# Patient Record
Sex: MALE | URBAN - METROPOLITAN AREA
[De-identification: names, ages, dates, MRNs, and addresses within clinical notes are randomized per-mention and may not be internally consistent; named-entity substitution may affect disease eponyms.]

---

## 2017-06-12 ENCOUNTER — OFFICE VISIT (OUTPATIENT)
Dept: FAMILY MEDICINE | Facility: CLINIC | Age: 22
End: 2017-06-12
Payer: COMMERCIAL

## 2017-06-12 VITALS
HEART RATE: 60 BPM | HEIGHT: 73 IN | TEMPERATURE: 98 F | BODY MASS INDEX: 25.31 KG/M2 | OXYGEN SATURATION: 97 % | SYSTOLIC BLOOD PRESSURE: 120 MMHG | WEIGHT: 191 LBS | DIASTOLIC BLOOD PRESSURE: 76 MMHG

## 2017-06-12 DIAGNOSIS — Z23 NEED FOR TDAP VACCINATION: ICD-10-CM

## 2017-06-12 DIAGNOSIS — Z00.00 ROUTINE GENERAL MEDICAL EXAMINATION AT A HEALTH CARE FACILITY: Primary | ICD-10-CM

## 2017-06-12 PROCEDURE — 99395 PREV VISIT EST AGE 18-39: CPT | Mod: 25 | Performed by: FAMILY MEDICINE

## 2017-06-12 PROCEDURE — 90715 TDAP VACCINE 7 YRS/> IM: CPT | Performed by: FAMILY MEDICINE

## 2017-06-12 PROCEDURE — 90471 IMMUNIZATION ADMIN: CPT | Performed by: FAMILY MEDICINE

## 2017-06-12 ASSESSMENT — PAIN SCALES - GENERAL: PAINLEVEL: NO PAIN (0)

## 2017-06-12 NOTE — PROGRESS NOTES
SUBJECTIVE:     CC: Harman Giles is an 21 year old male who presents for preventative health visit.     Healthy Habits:    Do you get at least three servings of calcium containing foods daily (dairy, green leafy vegetables, etc.)? yes    Amount of exercise or daily activities, outside of work: 5-6 day(s) per week    Problems taking medications regularly not applicable    Medication side effects: No    Have you had an eye exam in the past two years? yes    Do you see a dentist twice per year? 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    Last PSA: No results found for: PSA    No results for input(s): CHOL, HDL, LDL, TRIG, CHOLHDLRATIO, NHDL in the last 84741 hours.    Reviewed orders with patient. Reviewed health maintenance and updated orders accordingly - Yes    Reviewed and updated as needed this visit by clinical staff         Reviewed and updated as needed this visit by Provider            ROS:  C: NEGATIVE for fever, chills, change in weight  I: NEGATIVE for worrisome rashes, moles or lesions  E: NEGATIVE for vision changes or irritation  ENT: NEGATIVE for ear, mouth and throat problems  R: NEGATIVE for significant cough or SOB  CV: NEGATIVE for chest pain, palpitations or peripheral edema  GI:  NEGATIVE for nausea, abdominal pain, heartburn, or change in bowel habits   male: negative for dysuria, hematuria, decreased urinary stream, erectile dysfunction, urethral discharge  M: NEGATIVE for significant arthralgias or myalgia  N: NEGATIVE for weakness, dizziness or paresthesias  P: NEGATIVE for changes in mood or affect  Sees eye doctor annually   No std concerns  No need for birth control      OBJECTIVE:     There were no vitals taken for this visit.  EXAM:  GENERAL: healthy, alert and no distress  EYES: Eyes grossly normal to inspection, PERRL and conjunctivae and sclerae normal  HENT: ear canals and TM's normal, nose and mouth without ulcers or lesions  NECK: no adenopathy, no asymmetry, masses, or scars and thyroid normal to palpation  RESP: lungs clear to auscultation - no rales, rhonchi or wheezes  CV: regular rate and rhythm, normal S1 S2, no S3 or S4, no murmur, click or rub, no peripheral edema and peripheral pulses strong  ABDOMEN: soft, nontender, no hepatosplenomegaly, no masses and bowel sounds normal   (male): normal male genitalia without lesions or urethral discharge, no hernia  MS: no gross musculoskeletal defects noted, no edema  SKIN: no suspicious lesions or rashes  NEURO: Normal strength and tone, mentation intact and speech normal  PSYCH: mentation appears normal, affect normal/bright    ASSESSMENT/PLAN:     Harman was seen today for physical.    Diagnoses and all orders for this visit:    Routine general medical examination at a health care facility    Need for Tdap vaccination  -     TDAP VACCINE (ADACEL)    overall patient in very good health  Good energy level  tdap given   No std concern  Moving to [place]         COUNSELING:  Reviewed preventive health counseling, as reflected in patient instructions       Regular exercise       Healthy diet/nutrition       Vision screening       Family planning       Safe sex practices/STD prevention         reports that he has never smoked.  "He has never used smokeless tobacco.    Estimated body mass index is 25.45 kg/(m^2) as calculated from the following:    Height as of 12/23/13: 6' 0.83\" (1.85 m).    Weight as of 12/23/13: 192 lb (87.1 kg).       Counseling Resources:  ATP IV Guidelines  Pooled Cohorts Equation Calculator  FRAX Risk Assessment  ICSI Preventive Guidelines  Dietary Guidelines for Americans, 2010  USDA's MyPlate  ASA Prophylaxis  Lung CA Screening    Gab Smith MD  [facility]  "

## 2017-06-12 NOTE — PATIENT INSTRUCTIONS
Preventive Health Recommendations  Male Ages 18 - 25     Yearly exam:             See your health care provider every year in order to  o   Review health changes.   o   Discuss preventive care.    o   Review your medicines if your doctor has prescribed any.    You should be tested each year for STDs (sexually transmitted diseases).     Talk to your provider about cholesterol testing.      If you are at risk for diabetes, you should have a diabetes test (fasting glucose).    Shots: Get a flu shot each year. Get a tetanus shot every 10 years.     Nutrition:    Eat at least 5 servings of fruits and vegetables daily.     Eat whole-grain bread, whole-wheat pasta and brown rice instead of white grains and rice.     Talk to your provider about calcium and Vitamin D.     Lifestyle    Exercise for at least 150 minutes a week (30 minutes a day, 5 days a week). This will help you control your weight and prevent disease.     Limit alcohol to one drink per day.     No smoking.     Wear sunscreen to prevent skin cancer.     See your dentist every six months for an exam and cleaning.       Keep working on healthy diet/exercise     Call with problems/ questions

## 2017-06-12 NOTE — MR AVS SNAPSHOT
After Visit Summary   6/12/2017    Harman Giles    MRN: [id number]           Patient Information     Date Of Birth          1995        Visit Information        Provider Department      6/12/2017 7:20 AM Gab Smith MD [facility]        Today's Diagnoses     Need for Tdap vaccination    -  1      Care Instructions      Preventive Health Recommendations  Male Ages 18 - 25     Yearly exam:             See your health care provider every year in order to  o   Review health changes.   o   Discuss preventive care.    o   Review your medicines if your doctor has prescribed any.    You should be tested each year for STDs (sexually transmitted diseases).     Talk to your provider about cholesterol testing.      If you are at risk for diabetes, you should have a diabetes test (fasting glucose).    Shots: Get a flu shot each year. Get a tetanus shot every 10 years.     Nutrition:    Eat at least 5 servings of fruits and vegetables daily.     Eat whole-grain bread, whole-wheat pasta and brown rice instead of white grains and rice.     Talk to your provider about calcium and Vitamin D.     Lifestyle    Exercise for at least 150 minutes a week (30 minutes a day, 5 days a week). This will help you control your weight and prevent disease.     Limit alcohol to one drink per day.     No smoking.     Wear sunscreen to prevent skin cancer.     See your dentist every six months for an exam and cleaning.       Keep working on healthy diet/exercise     Call with problems/ questions              Follow-ups after your visit        Who to contact     If you have questions or need follow up information about today's clinic visit or your schedule please contact [facility] directly at [phone].  Normal or non-critical lab and imaging results will be communicated to you by MyChart, letter or phone within 4 business days after the clinic has received the  "results. If you do not hear from us within 7 days, please contact the clinic through [employer] or phone. If you have a critical or abnormal lab result, we will notify you by phone as soon as possible.  Submit refill requests through [employer] or call your pharmacy and they will forward the refill request to us. Please allow 3 business days for your refill to be completed.          Additional Information About Your Visit        Cell>[employer] Information     [employer] lets you send messages to your doctor, view your test results, renew your prescriptions, schedule appointments and more. To sign up, go to [email or web address] . Click on \"Log in\" on the left side of the screen, which will take you to the Welcome page. Then click on \"Sign up Now\" on the right side of the page.     You will be asked to enter the access code listed below, as well as some personal information. Please follow the directions to create your username and password.     Your access code is: [email or web address]  Expires: 9/10/2017  7:39 AM     Your access code will  in 90 days. If you need help or a new code, please call your [place] clinic or [phone].        Care EveryWhere ID     This is your Care EveryWhere ID. This could be used by other organizations to access your [place] medical records  [id number]        Your Vitals Were     Pulse Temperature Height Pulse Oximetry BMI (Body Mass Index)       60 98  F (36.7  C) (Oral) 6' 0.75\" (1.848 m) 97% 25.37 kg/m2        Blood Pressure from Last 3 Encounters:   17 120/76   14 144/69   14 123/71    Weight from Last 3 Encounters:   17 191 lb (86.6 kg)   13 192 lb (87.1 kg)   11 167 lb (75.8 kg) (75 %)*     * Growth percentiles are based on CDC 2-20 Years data.              We Performed the Following     TDAP VACCINE (ADACEL)        Primary Care Provider Office Phone # Fax #    Gab Smith [phone]       [address]  [facility] [address]        Thank you!     Thank you for choosing [facility]  for your care. Our goal is always to provide you with excellent care. Hearing back from our patients is one way we can continue to improve our services. Please take a few minutes to complete the written survey that you may receive in the mail after your visit with us. Thank you!             Your Updated Medication List - Protect others around you: Learn how to safely use, store and throw away your medicines at www.disposemymeds.org.          This list is accurate as of: 6/12/17  7:39 AM.  Always use your most recent med list.                   Brand Name Dispense Instructions for use    ibuprofen 200 MG tablet   Generic drug:  ibuprofen      1 TABLET EVERY 4 TO 6 HOURS AS NEEDED       MULTIVITAMIN TABS   OR      1 TABLET DAILY

## 2018-05-16 DIAGNOSIS — Z52.001 DONOR OF STEM CELL: Primary | ICD-10-CM

## 2018-05-31 ENCOUNTER — APPOINTMENT (OUTPATIENT)
Dept: LAB | Facility: CLINIC | Age: 23
End: 2018-05-31
Attending: INTERNAL MEDICINE
Payer: COMMERCIAL

## 2018-05-31 ENCOUNTER — OFFICE VISIT (OUTPATIENT)
Dept: TRANSPLANT | Facility: CLINIC | Age: 23
End: 2018-05-31
Attending: INTERNAL MEDICINE
Payer: COMMERCIAL

## 2018-05-31 ENCOUNTER — RADIANT APPOINTMENT (OUTPATIENT)
Dept: GENERAL RADIOLOGY | Facility: CLINIC | Age: 23
End: 2018-05-31
Attending: INTERNAL MEDICINE
Payer: COMMERCIAL

## 2018-05-31 VITALS
RESPIRATION RATE: 16 BRPM | SYSTOLIC BLOOD PRESSURE: 151 MMHG | TEMPERATURE: 98 F | WEIGHT: 190.3 LBS | OXYGEN SATURATION: 100 % | HEIGHT: 73 IN | HEART RATE: 59 BPM | DIASTOLIC BLOOD PRESSURE: 86 MMHG | BODY MASS INDEX: 25.22 KG/M2

## 2018-05-31 DIAGNOSIS — Z52.001 DONOR OF STEM CELL: ICD-10-CM

## 2018-05-31 DIAGNOSIS — Z52.001 DONOR OF STEM CELL: Primary | ICD-10-CM

## 2018-05-31 LAB
ABO + RH BLD: NORMAL
ABO + RH BLD: NORMAL
ALBUMIN SERPL-MCNC: 3.9 G/DL (ref 3.4–5)
ALBUMIN UR-MCNC: NEGATIVE MG/DL
ALP SERPL-CCNC: 79 U/L (ref 40–150)
ALT SERPL W P-5'-P-CCNC: 33 U/L (ref 0–70)
ANION GAP SERPL CALCULATED.3IONS-SCNC: 7 MMOL/L (ref 3–14)
APPEARANCE UR: CLEAR
APTT PPP: 26 SEC (ref 22–37)
AST SERPL W P-5'-P-CCNC: 22 U/L (ref 0–45)
BASOPHILS # BLD AUTO: 0 10E9/L (ref 0–0.2)
BASOPHILS NFR BLD AUTO: 0.2 %
BILIRUB SERPL-MCNC: 0.5 MG/DL (ref 0.2–1.3)
BILIRUB UR QL STRIP: NEGATIVE
BLD GP AB SCN SERPL QL: NORMAL
BLOOD BANK CMNT PATIENT-IMP: NORMAL
BUN SERPL-MCNC: 16 MG/DL (ref 7–30)
CALCIUM SERPL-MCNC: 9 MG/DL (ref 8.5–10.1)
CHLORIDE SERPL-SCNC: 104 MMOL/L (ref 94–109)
CO2 SERPL-SCNC: 27 MMOL/L (ref 20–32)
COLOR UR AUTO: YELLOW
CREAT SERPL-MCNC: 1.09 MG/DL (ref 0.66–1.25)
DIFFERENTIAL METHOD BLD: NORMAL
EOSINOPHIL # BLD AUTO: 0.3 10E9/L (ref 0–0.7)
EOSINOPHIL NFR BLD AUTO: 4 %
ERYTHROCYTE [DISTWIDTH] IN BLOOD BY AUTOMATED COUNT: 11.9 % (ref 10–15)
GFR SERPL CREATININE-BSD FRML MDRD: 83 ML/MIN/1.7M2
GLUCOSE SERPL-MCNC: 67 MG/DL (ref 70–99)
GLUCOSE UR STRIP-MCNC: NEGATIVE MG/DL
HCT VFR BLD AUTO: 45.1 % (ref 40–53)
HGB BLD-MCNC: 15.9 G/DL (ref 13.3–17.7)
HGB UR QL STRIP: NEGATIVE
IMM GRANULOCYTES # BLD: 0 10E9/L (ref 0–0.4)
IMM GRANULOCYTES NFR BLD: 0.6 %
INR PPP: 1.02 (ref 0.86–1.14)
KETONES UR STRIP-MCNC: NEGATIVE MG/DL
LDH SERPL L TO P-CCNC: 158 U/L (ref 85–227)
LEUKOCYTE ESTERASE UR QL STRIP: ABNORMAL
LYMPHOCYTES # BLD AUTO: 1.8 10E9/L (ref 0.8–5.3)
LYMPHOCYTES NFR BLD AUTO: 27.9 %
MCH RBC QN AUTO: 31.9 PG (ref 26.5–33)
MCHC RBC AUTO-ENTMCNC: 35.3 G/DL (ref 31.5–36.5)
MCV RBC AUTO: 91 FL (ref 78–100)
MONOCYTES # BLD AUTO: 0.5 10E9/L (ref 0–1.3)
MONOCYTES NFR BLD AUTO: 7.4 %
NEUTROPHILS # BLD AUTO: 3.9 10E9/L (ref 1.6–8.3)
NEUTROPHILS NFR BLD AUTO: 59.9 %
NITRATE UR QL: NEGATIVE
NRBC # BLD AUTO: 0 10*3/UL
NRBC BLD AUTO-RTO: 0 /100
PH UR STRIP: 6 PH (ref 5–7)
PLATELET # BLD AUTO: 164 10E9/L (ref 150–450)
POTASSIUM SERPL-SCNC: 3.9 MMOL/L (ref 3.4–5.3)
PROT SERPL-MCNC: 6.8 G/DL (ref 6.8–8.8)
RBC # BLD AUTO: 4.98 10E12/L (ref 4.4–5.9)
RBC #/AREA URNS AUTO: 1 /HPF (ref 0–2)
SODIUM SERPL-SCNC: 139 MMOL/L (ref 133–144)
SOURCE: ABNORMAL
SP GR UR STRIP: 1.01 (ref 1–1.03)
SPECIMEN EXP DATE BLD: NORMAL
UROBILINOGEN UR STRIP-MCNC: 0 MG/DL (ref 0–2)
WBC # BLD AUTO: 6.5 10E9/L (ref 4–11)
WBC #/AREA URNS AUTO: 2 /HPF (ref 0–5)

## 2018-05-31 PROCEDURE — 80053 COMPREHEN METABOLIC PANEL: CPT | Performed by: INTERNAL MEDICINE

## 2018-05-31 PROCEDURE — 93005 ELECTROCARDIOGRAM TRACING: CPT

## 2018-05-31 PROCEDURE — 86901 BLOOD TYPING SEROLOGIC RH(D): CPT | Performed by: INTERNAL MEDICINE

## 2018-05-31 PROCEDURE — 93010 ELECTROCARDIOGRAM REPORT: CPT | Mod: ZP | Performed by: INTERNAL MEDICINE

## 2018-05-31 PROCEDURE — 83615 LACTATE (LD) (LDH) ENZYME: CPT | Performed by: INTERNAL MEDICINE

## 2018-05-31 PROCEDURE — 85610 PROTHROMBIN TIME: CPT | Performed by: INTERNAL MEDICINE

## 2018-05-31 PROCEDURE — 81001 URINALYSIS AUTO W/SCOPE: CPT | Performed by: INTERNAL MEDICINE

## 2018-05-31 PROCEDURE — 86900 BLOOD TYPING SEROLOGIC ABO: CPT | Performed by: INTERNAL MEDICINE

## 2018-05-31 PROCEDURE — 85730 THROMBOPLASTIN TIME PARTIAL: CPT | Performed by: INTERNAL MEDICINE

## 2018-05-31 PROCEDURE — 86850 RBC ANTIBODY SCREEN: CPT | Performed by: INTERNAL MEDICINE

## 2018-05-31 PROCEDURE — 83021 HEMOGLOBIN CHROMOTOGRAPHY: CPT | Performed by: INTERNAL MEDICINE

## 2018-05-31 PROCEDURE — 85025 COMPLETE CBC W/AUTO DIFF WBC: CPT | Performed by: INTERNAL MEDICINE

## 2018-05-31 PROCEDURE — 36415 COLL VENOUS BLD VENIPUNCTURE: CPT

## 2018-05-31 ASSESSMENT — PAIN SCALES - GENERAL: PAINLEVEL: NO PAIN (0)

## 2018-05-31 NOTE — MR AVS SNAPSHOT
After Visit Summary   5/31/2018    Harman Giles    MRN: [id number]           Patient Information     Date Of Birth          1995        Visit Information        Provider Department      5/31/2018 9:00 AM Coordinator, Emmett Bmt Nurse; EMMETT 2 116 CONSULT [facility] Blood and Marrow Transplant        Today's Diagnoses     Donor of stem cell    -  1          [facility]  [address] [place] [address]  Phone: [phone]  Clinic Hours:   Monday-Thursday:7am to 7pm   Friday: 7am to 5pm   Weekends and holidays:    8am to noon (in general)  If your fever is 100.5  or greater,   call the clinic.  After hours call the   hospital at [phone] or   [phone]. Ask for the BMT   fellow on-call            Follow-ups after your visit        Your next 10 appointments already scheduled     May 31, 2018  9:00 AM CDT   BMT NURSE COORD WITH ROOM with  Bmt Nurse Coordinator, EMMETT 2 116 CONSULT [facility] Blood and Marrow Transplant ([facility])    [address] [place] [address]  [address]  [facility] [address] [phone]            May 31, 2018  9:30 AM CDT   XR CHEST 2 VIEWS with UCXR1   [facility] [place] [facility] ([facility])    [address] [place] [address]  1st Floor  [facility] [address] [phone]           Please bring a list of your current medicines to your exam. (Include vitamins, minerals and over-thecounter medicines.) Leave your valuables at home.  Tell your doctor if there is a chance you may be pregnant.  You do not need to do anything special for this exam.            May 31, 2018 10:00 AM CDT   Unrelated Donor Visit with  BMT KORI #1   [facility] Blood and Marrow Transplant ([facility])    [address] [place] [address]  [address]  [facility] [address] [phone]              Who to contact     If you have questions or need follow up information about today's clinic visit or  "your schedule please contact [facility] BLOOD AND MARROW TRANSPLANT directly at [phone].  Normal or non-critical lab and imaging results will be communicated to you by MyChart, letter or phone within 4 business days after the clinic has received the results. If you do not hear from us within 7 days, please contact the clinic through [employer] or phone. If you have a critical or abnormal lab result, we will notify you by phone as soon as possible.  Submit refill requests through [employer] or call your pharmacy and they will forward the refill request to us. Please allow 3 business days for your refill to be completed.          Additional Information About Your Visit        [employer] Information     [employer] lets you send messages to your doctor, view your test results, renew your prescriptions, schedule appointments and more. To sign up, go to [email or web address] . Click on \"Log in\" on the left side of the screen, which will take you to the Welcome page. Then click on \"Sign up Now\" on the right side of the page.     You will be asked to enter the access code listed below, as well as some personal information. Please follow the directions to create your username and password.     Your access code is: [email or web address]  Expires: 8/15/2018  6:30 AM     Your access code will  in 90 days. If you need help or a new code, please call your [place] clinic or [phone].        Care EveryWhere ID     This is your Care EveryWhere ID. This could be used by other organizations to access your [place] medical records  [id number]         Blood Pressure from Last 3 Encounters:   17 120/76   14 144/69   14 123/71    Weight from Last 3 Encounters:   17 86.6 kg (191 lb)   13 87.1 kg (192 lb)   11 75.8 kg (167 lb) (75 %)*     * Growth percentiles are based on CDC 2-20 Years data.              Today, you had the following     No orders found for display       Recent Review Flowsheet Data     BMT " Recent Results Latest Ref Rng & Units 12/23/2013 5/31/2018    WBC 4.0 - 11.0 10e9/L - 6.5    Hemoglobin 13.3 - 17.7 g/dL 15.4 15.9    Platelet Count 150 - 450 10e9/L - 164    Neutrophils (Absolute) 1.6 - 8.3 10e9/L - 3.9    INR 0.86 - 1.14 - 1.02    Sodium 133 - 144 mmol/L - 139    Potassium 3.4 - 5.3 mmol/L - 3.9    Chloride 94 - 109 mmol/L - 104    Glucose 70 - 99 mg/dL - 67(L)    Urea Nitrogen 7 - 30 mg/dL - 16    Creatinine 0.66 - 1.25 mg/dL - 1.09    Calcium (Total) 8.5 - 10.1 mg/dL - 9.0    Protein (Total) 6.8 - 8.8 g/dL - 6.8    Albumin 3.4 - 5.0 g/dL - 3.9    Alkaline Phosphatase 40 - 150 U/L - 79    AST 0 - 45 U/L - 22    ALT 0 - 70 U/L - 33    MCV 78 - 100 fl - 91               Primary Care Provider Office Phone # Fax #    Gab Smith [phone]       [address] [address]        Equal Access to Services     JOSE GUADALUPE CHANG : Hadii aad ku hadasho Soomaali, waaxda luqadaha, qaybta kaalmada adeegyada, waxay idiin jenny bruce. So [facility] [phone].    ATENCIÓN: Si habla español, tiene a oneil disposición servicios gratuitos de asistencia lingüística. ame al [phone].    We comply with applicable federal civil rights laws and [place] laws. We do not discriminate on the basis of race, color, national origin, age, disability, sex, sexual orientation, or gender identity.            Thank you!     Thank you for choosing [facility] BLOOD AND MARROW TRANSPLANT  for your care. Our goal is always to provide you with excellent care. Hearing back from our patients is one way we can continue to improve our services. Please take a few minutes to complete the written survey that you may receive in the mail after your visit with us. Thank you!             Your Updated Medication List - Protect others around you: Learn how to safely use, store and throw away your medicines at www.disposemymeds.org.          This list is accurate as of 5/31/18  8:54 AM.  Always  use your most recent med list.                   Brand Name Dispense Instructions for use Diagnosis    ibuprofen 200 MG tablet   Generic drug:  ibuprofen      1 TABLET EVERY 4 TO 6 HOURS AS NEEDED        MULTIVITAMIN TABS   OR      1 TABLET DAILY

## 2018-05-31 NOTE — MR AVS SNAPSHOT
After Visit Summary   5/31/2018    Harman Giles    MRN: [id number]           Patient Information     Date Of Birth          1995        Visit Information        Provider Department      5/31/2018 8:30 AM 1, Emmett Bmt Nurse [facility] Blood and Marrow Transplant        Today's Diagnoses     Donor of stem cell              [facility]  [address] [place] [address]  Phone: [phone]  Clinic Hours:   Monday-Thursday:7am to 7pm   Friday: 7am to 5pm   Weekends and holidays:    8am to noon (in general)  If your fever is 100.5  or greater,   call the clinic.  After hours call the   hospital at [phone] or   [phone]. Ask for the BMT   fellow on-call            Follow-ups after your visit        Your next 10 appointments already scheduled     May 31, 2018  8:30 AM CDT   BMT Nurse Visit with  Bmt Nurse 1   [facility] Blood and Marrow Transplant ([facility])    [address] [place] [address]  [address] [place] [address] [phone]            May 31, 2018  9:00 AM CDT   BMT NURSE COORD WITH ROOM with  Bmt Nurse Coordinator,  2 116 CONSULT RM   [facility] Blood and Marrow Transplant ([facility])    [address] [place] [address]  [address] [place] [address] [phone]            May 31, 2018  9:30 AM CDT   XR CHEST 2 VIEWS with UCXR1   [facility] ([facility])    [address] [place] [address]  1st Floor  [facility] [id number] [phone]           Please bring a list of your current medicines to your exam. (Include vitamins, minerals and over-thecounter medicines.) Leave your valuables at home.  Tell your doctor if there is a chance you may be pregnant.  You do not need to do anything special for this exam.            May 31, 2018 10:00 AM CDT   Unrelated Donor Visit with  BMT KORI #1   [facility] Blood and Marrow Transplant ([facility])  "   [facility]  [address] [place] [address] [phone]              Who to contact     If you have questions or need follow up information about today's clinic visit or your schedule please contact [facility] BLOOD AND MARROW TRANSPLANT directly at [phone].  Normal or non-critical lab and imaging results will be communicated to you by MyChart, letter or phone within 4 business days after the clinic has received the results. If you do not hear from us within 7 days, please contact the clinic through MyChart or phone. If you have a critical or abnormal lab result, we will notify you by phone as soon as possible.  Submit refill requests through [employer] or call your pharmacy and they will forward the refill request to us. Please allow 3 business days for your refill to be completed.          Additional Information About Your Visit        MyChart Information     [employer] lets you send messages to your doctor, view your test results, renew your prescriptions, schedule appointments and more. To sign up, go to [email or web address] . Click on \"Log in\" on the left side of the screen, which will take you to the Welcome page. Then click on \"Sign up Now\" on the right side of the page.     You will be asked to enter the access code listed below, as well as some personal information. Please follow the directions to create your username and password.     Your access code is: [email or web address]  Expires: 8/15/2018  6:30 AM     Your access code will  in 90 days. If you need help or a new code, please call your [place] clinic or [phone].        Care EveryWhere ID     This is your Care EveryWhere ID. This could be used by other organizations to access your [place] medical records  [id number]         Blood Pressure from Last 3 Encounters:   17 120/76   14 144/69   14 123/71    Weight from Last 3 Encounters:   17 86.6 kg (191 lb)   13 87.1 kg (192 lb)   11 75.8 kg " (167 lb) (75 %)*     * Growth percentiles are based on [facility] 2-20 Years data.              We Performed the Following     ABO/Rh type and screen     CBC with platelets differential     Comprehensive metabolic panel     EKG 12-lead complete w/read - Clinics     Hemoglobin S     INR     Lactate Dehydrogenase     Partial thromboplastin time        Recent Review Flowsheet Data     BMT Recent Results Latest Ref Rng & Units 12/23/2013    Hemoglobin 13.3 - 17.7 g/dL 15.4               Primary Care Provider Office Phone # Fax #    Gab Smith [phone]       [address] [facility] [place] [address]        Equal Access to Services     [facility]: Hadii aad ku hadasho Soomaali, waaxda luqadaha, qaybta kaalmada adepremayapatricio, nicola alvarado adeprema jaimes . So [facility] [phone].    ATENCIÓN: Si habla español, tiene a oneil disposición servicios gratuitos de asistencia lingüística. [facility] [phone].    We comply with applicable federal civil rights laws and [place] laws. We do not discriminate on the basis of race, color, national origin, age, disability, sex, sexual orientation, or gender identity.            Thank you!     Thank you for choosing [facility] BLOOD AND MARROW TRANSPLANT  for your care. Our goal is always to provide you with excellent care. Hearing back from our patients is one way we can continue to improve our services. Please take a few minutes to complete the written survey that you may receive in the mail after your visit with us. Thank you!             Your Updated Medication List - Protect others around you: Learn how to safely use, store and throw away your medicines at www.disposemymeds.org.          This list is accurate as of 5/31/18  8:06 AM.  Always use your most recent med list.                   Brand Name Dispense Instructions for use Diagnosis    ibuprofen 200 MG tablet   Generic drug:  ibuprofen      1 TABLET EVERY 4 TO 6 HOURS AS NEEDED         MULTIVITAMIN TABS   OR      1 TABLET DAILY

## 2018-05-31 NOTE — PROGRESS NOTES
BMT Donor History and Physical    Harman Giles MRN# [id number]   Age: 22 year old YOB: 1995            Assessment and Plan   Harman Giles is a 22 year old man who is a potential unrelated donor of bone marrow for the [facility]. He will be cleared for donation by Dr. Bledsoe who will review his infectious disease testing.  Clearance for donation is dependent on negative infectious disease testing. He has no known risk factors.     HPI: Harman is currently in good health.  No recent fevers, chills or weight loss.  He is not currently under the care of a physician for any conditions. His parents live here in the [facility] and his next door neighbor used to work at the [facility].  EKG and CXR are within normal limits as is CBC and chemistries. Has isolated +LE on UA without any symptoms. Addendum: no history of hypertension.  Will repeat BP at next visit.  Transfusions: No  Hepatitis: No  Currently pregnant or any chance may be pregnant: Not applicable  Currently breast feeding: No  Currently using a method of birth control: No Not applicable: Patient is male  Number of previous pregnancies: 0, not applicable  Alcohol use: Yes, amount per week: Drinks on the weekends with his friends  Tobacco use: No  Recreational drugs: No  Nonmedical percutaneous drug use: No  Recent immunizations or planning on getting any immunizations: No  Ear or body piercing in the last 12 months: No  New tattoo in recent 12 months:No  History of cancer or blood disease: No  Known risk factors: No travel out of the [place].  No risky sexual behavior.         Past Medical History:   Not under the care of a physician. No current or past medical conditions.         Past Surgical History:    Deviated septum surgery about 5 years ago with repair  Right arm surgery for fracture at 3 years old  [place] teeth removal         Social History:     Lives in [place] but is from [place] where his parents currently lives.  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  Works out, plays basketball       Family History:   Grandpa x2 had prostate cancer in their 80's.  No history of heart disease or stroke  Grandma had late onset diabetes  Parents are healthy  Brother is healthy         Immunizations:   Immunizations are up to date         Allergies:   Keflex- got a stomach from it         Medications:   Not currently taking medication including over the counter.         Review of Systems:   Constitutional: Negative, no fevers, chills or sweats, no recent weight loss   - Skin: No rash   - Musculoskeletal: no issues  - Eyes: No history of glaucoma, no double vision or floaters   - ENT: Negative, no URI symptoms, teeth in good repair   - Endocrine:no diabetes or thyroid disease  - Respiratory: Negative, no cough or sob at rest or with exertion   - Cardiovascular: Negative, no chest pain or palpitations. No history of hypertension   - Heme: Negative, no bleeding   - Lymph: Negative   - GI: No diarrhea or constipation. No blood in stool or black or tarry stools.  - : Negative, no dysuria or frequent urination, no hematuria   - Neuro: Negative  -MSK: No current issues      Physical EXAM:  Blood pressure 151/86, pulse 59, temperature 98  F (36.7  C), resp. rate 16, height 1.848 m (6' 0.76\"), weight 86.3 kg (190 lb 4.8 oz), SpO2 100 %.          General: A&O x3, appropriate, NAD, physically fit young man   - Head: NC/AT   - Eyes: PERRL. Sclera anicteric.   - Nose/Mouth/Throat: No OP erythema, buccal mucosa moist, no ulcerations.   - Neck: Supple. No LAD appreciated.   - Lungs: CTA b/l. No crackles.   - Cardiovascular: RRR, No M/R/G.   - Abdominal/Rectal: Soft, NT,ND, +BS x4   - Lymphatics: No edema.   - Musculoskeletal: Full strength.   - Skin: No rashes or petechaie.   - Neuro: A&O x 3; Non-focal, CN II-XII grossly intact                   Data:   All laboratory data reviewed  All cardiac studies reviewed by me.  All imaging studies reviewed by " me.       Namrata Ortiz PA-C

## 2018-05-31 NOTE — PROGRESS NOTES
Met with donor and discussed plan for visit.Donor will undergo a bone marrow harvest.  Discussed process for clearing the donor, the preop visit, the harvest and basic information about the recovery. Contact information provided. Questions answered.

## 2018-05-31 NOTE — MR AVS SNAPSHOT
"              After Visit Summary   5/31/2018    Harman Giles    MRN: [id number]           Patient Information     Date Of Birth          1995        Visit Information        Provider Department      5/31/2018 10:00 AM  BMT KORI #1 [facility] Blood and Marrow Transplant        Today's Diagnoses     Donor of stem cell              [facility] ([facility]  [address] [place] [address]  Phone: [phone]  Clinic Hours:   Monday-Thursday:7am to 7pm   Friday: 7am to 5pm   Weekends and holidays:    8am to noon (in general)  If your fever is 100.5  or greater,   call the clinic.  After hours call the   hospital at [phone] or   [phone]. Ask for the BMT   fellow on-call           Care Instructions    Return per nurse coordinator the day before harvest, NC= Yissel MCLEAN            Follow-ups after your visit        Who to contact     If you have questions or need follow up information about today's clinic visit or your schedule please contact [facility] BLOOD AND MARROW TRANSPLANT directly at [phone].  Normal or non-critical lab and imaging results will be communicated to you by [email or web address], letter or phone within 4 business days after the clinic has received the results. If you do not hear from us within 7 days, please contact the clinic through Acamicat or phone. If you have a critical or abnormal lab result, we will notify you by phone as soon as possible.  Submit refill requests through OdinOtvet or call your pharmacy and they will forward the refill request to us. Please allow 3 business days for your refill to be completed.          Additional Information About Your Visit        [email or web address] Information     OdinOtvet lets you send messages to your doctor, view your test results, renew your prescriptions, schedule appointments and more. To sign up, go to [email or web address] . Click on \"Log in\" on the left side of the screen, which will take you to the Welcome page. Then click on " "\"Sign up Now\" on the right side of the page.     You will be asked to enter the access code listed below, as well as some personal information. Please follow the directions to create your username and password.     Your access code is: [email or web address]  Expires: 8/15/2018  6:30 AM     Your access code will  in 90 days. If you need help or a new code, please call your [facility] or [phone].        Care EveryWhere ID     This is your Care EveryWhere ID. This could be used by other organizations to access your [place] medical records  [id number]        Your Vitals Were     Pulse Temperature Respirations Height Pulse Oximetry BMI (Body Mass Index)    59 98  F (36.7  C) 16 1.848 m (6' 0.76\") 100% 25.28 kg/m2       Blood Pressure from Last 3 Encounters:   18 151/86   17 120/76   14 144/69    Weight from Last 3 Encounters:   18 86.3 kg (190 lb 4.8 oz)   17 86.6 kg (191 lb)   13 87.1 kg (192 lb)              We Performed the Following     Routine UA with microscopic        Recent Review Flowsheet Data     BMT Recent Results Latest Ref Rng & Units 2013    WBC 4.0 - 11.0 10e9/L - 6.5    Hemoglobin 13.3 - 17.7 g/dL 15.4 15.9    Platelet Count 150 - 450 10e9/L - 164    Neutrophils (Absolute) 1.6 - 8.3 10e9/L - 3.9    INR 0.86 - 1.14 - 1.02    Sodium 133 - 144 mmol/L - 139    Potassium 3.4 - 5.3 mmol/L - 3.9    Chloride 94 - 109 mmol/L - 104    Glucose 70 - 99 mg/dL - 67(L)    Urea Nitrogen 7 - 30 mg/dL - 16    Creatinine 0.66 - 1.25 mg/dL - 1.09    Calcium (Total) 8.5 - 10.1 mg/dL - 9.0    Protein (Total) 6.8 - 8.8 g/dL - 6.8    Albumin 3.4 - 5.0 g/dL - 3.9    Alkaline Phosphatase 40 - 150 U/L - 79    AST 0 - 45 U/L - 22    ALT 0 - 70 U/L - 33    MCV 78 - 100 fl - 91               Primary Care Provider Office Phone # Fax #    Gab Smith [phone] [address] [facility] [address]        Equal Access to Services     " JOSE GUADALUPE CHANG : Hadii aad ku shadi Castillo, wajuan mda luqadaha, qaybta kaalmada erendirabopatricio, waxdamon rey reddyordanhitesh jaimes . So [facility] [phone].    ATENCIÓN: Si habla español, tiene a oneil disposición servicios gratuitos de asistencia lingüística. Llame al [phone].    We comply with applicable federal civil rights laws and [place] laws. We do not discriminate on the basis of race, color, national origin, age, disability, sex, sexual orientation, or gender identity.            Thank you!     Thank you for choosing [facility] BLOOD AND MARROW TRANSPLANT  for your care. Our goal is always to provide you with excellent care. Hearing back from our patients is one way we can continue to improve our services. Please take a few minutes to complete the written survey that you may receive in the mail after your visit with us. Thank you!             Your Updated Medication List - Protect others around you: Learn how to safely use, store and throw away your medicines at www.disposemymeds.org.          This list is accurate as of 5/31/18  3:51 PM.  Always use your most recent med list.                   Brand Name Dispense Instructions for use Diagnosis    ibuprofen 200 MG tablet   Generic drug:  ibuprofen      1 TABLET EVERY 4 TO 6 HOURS AS NEEDED        MULTIVITAMIN TABS   OR      1 TABLET DAILY

## 2018-06-01 LAB — INTERPRETATION ECG - MUSE: NORMAL

## 2018-06-03 LAB — LAB SCANNED RESULT: NORMAL

## 2018-06-18 DIAGNOSIS — Z52.001 STEM CELL DONOR: Primary | ICD-10-CM

## 2018-06-19 DIAGNOSIS — Z52.001 DONOR OF STEM CELL: Primary | ICD-10-CM

## 2018-07-09 ENCOUNTER — ONCOLOGY VISIT (OUTPATIENT)
Dept: TRANSPLANT | Facility: CLINIC | Age: 23
End: 2018-07-09
Attending: PHYSICIAN ASSISTANT
Payer: COMMERCIAL

## 2018-07-09 VITALS
HEIGHT: 73 IN | OXYGEN SATURATION: 99 % | RESPIRATION RATE: 18 BRPM | HEART RATE: 60 BPM | WEIGHT: 191.7 LBS | SYSTOLIC BLOOD PRESSURE: 133 MMHG | TEMPERATURE: 98 F | DIASTOLIC BLOOD PRESSURE: 90 MMHG | BODY MASS INDEX: 25.41 KG/M2

## 2018-07-09 DIAGNOSIS — Z52.001 DONOR OF STEM CELL: Primary | ICD-10-CM

## 2018-07-09 DIAGNOSIS — Z52.001 DONOR OF STEM CELL: ICD-10-CM

## 2018-07-09 LAB
ABO + RH BLD: NORMAL
ABO + RH BLD: NORMAL
BASOPHILS # BLD AUTO: 0 10E9/L (ref 0–0.2)
BASOPHILS NFR BLD AUTO: 0.3 %
BLD GP AB SCN SERPL QL: NORMAL
BLOOD BANK CMNT PATIENT-IMP: NORMAL
DIFFERENTIAL METHOD BLD: NORMAL
EOSINOPHIL # BLD AUTO: 0.3 10E9/L (ref 0–0.7)
EOSINOPHIL NFR BLD AUTO: 4.7 %
ERYTHROCYTE [DISTWIDTH] IN BLOOD BY AUTOMATED COUNT: 11.9 % (ref 10–15)
HCT VFR BLD AUTO: 47.8 % (ref 40–53)
HGB BLD-MCNC: 16.5 G/DL (ref 13.3–17.7)
IMM GRANULOCYTES # BLD: 0 10E9/L (ref 0–0.4)
IMM GRANULOCYTES NFR BLD: 0.6 %
LYMPHOCYTES # BLD AUTO: 1.9 10E9/L (ref 0.8–5.3)
LYMPHOCYTES NFR BLD AUTO: 29.6 %
MCH RBC QN AUTO: 31.1 PG (ref 26.5–33)
MCHC RBC AUTO-ENTMCNC: 34.5 G/DL (ref 31.5–36.5)
MCV RBC AUTO: 90 FL (ref 78–100)
MONOCYTES # BLD AUTO: 0.6 10E9/L (ref 0–1.3)
MONOCYTES NFR BLD AUTO: 9.1 %
NEUTROPHILS # BLD AUTO: 3.6 10E9/L (ref 1.6–8.3)
NEUTROPHILS NFR BLD AUTO: 55.7 %
NRBC # BLD AUTO: 0 10*3/UL
NRBC BLD AUTO-RTO: 0 /100
PLATELET # BLD AUTO: 187 10E9/L (ref 150–450)
RBC # BLD AUTO: 5.31 10E12/L (ref 4.4–5.9)
SPECIMEN EXP DATE BLD: NORMAL
WBC # BLD AUTO: 6.4 10E9/L (ref 4–11)

## 2018-07-09 PROCEDURE — 86901 BLOOD TYPING SEROLOGIC RH(D): CPT | Performed by: INTERNAL MEDICINE

## 2018-07-09 PROCEDURE — 86850 RBC ANTIBODY SCREEN: CPT | Performed by: INTERNAL MEDICINE

## 2018-07-09 PROCEDURE — 36415 COLL VENOUS BLD VENIPUNCTURE: CPT

## 2018-07-09 PROCEDURE — 85025 COMPLETE CBC W/AUTO DIFF WBC: CPT | Performed by: PHYSICIAN ASSISTANT

## 2018-07-09 PROCEDURE — G0463 HOSPITAL OUTPT CLINIC VISIT: HCPCS | Mod: ZF

## 2018-07-09 PROCEDURE — 86900 BLOOD TYPING SEROLOGIC ABO: CPT | Performed by: INTERNAL MEDICINE

## 2018-07-09 ASSESSMENT — PAIN SCALES - GENERAL: PAINLEVEL: NO PAIN (0)

## 2018-07-09 NOTE — NURSING NOTE
Chief Complaint   Patient presents with     Blood Draw     Labs        Vitals done, labs drawn by venipuncture.  See doc flow sheets for details.  Lili Vences CMA

## 2018-07-09 NOTE — NURSING NOTE
"Oncology Rooming Note    July 9, 2018 8:25 AM   Harman Giles is a 22 year old male who presents for:    Chief Complaint   Patient presents with     RECHECK     PRE HARVEST H&P     Initial Vitals: /90 (BP Location: Right arm, Patient Position: Sitting, Cuff Size: Adult Regular)  Pulse 60  Temp 98  F (36.7  C) (Oral)  Resp 18  Ht 1.848 m (6' 0.76\")  Wt 87 kg (191 lb 11.2 oz)  SpO2 99%  BMI 25.46 kg/m2 Estimated body mass index is 25.46 kg/(m^2) as calculated from the following:    Height as of this encounter: 1.848 m (6' 0.76\").    Weight as of this encounter: 87 kg (191 lb 11.2 oz). Body surface area is 2.11 meters squared.  No Pain (0) Comment: Data Unavailable   No LMP for male patient.  Allergies reviewed: Yes  Medications reviewed: Yes    Medications: Medication refills not needed today.  Pharmacy name entered into [facility]: [facility] - [address]    Clinical concerns: N/A     3 minutes for nursing intake (face to face time)     LORNA LAROSE CMA              "

## 2018-07-09 NOTE — MR AVS SNAPSHOT
After Visit Summary   7/9/2018    Harman Giles    MRN: [id number]           Patient Information     Date Of Birth          1995        Visit Information        Provider Department      7/9/2018 8:30 AM  BMT KORI #1 [facility] Blood and Marrow Transplant        Today's Diagnoses     Donor of stem cell    -  1          Clinics and Surgery Center ([facility])  [address] [place] [address]  Phone: [phone]  Clinic Hours:   Monday-Thursday:7am to 7pm   Friday: 7am to 5pm   Weekends and holidays:    8am to noon (in general)  If your fever is 100.5  or greater,   call the clinic.  After hours call the   hospital at [phone] or   [phone]. Ask for the BMT   fellow on-call            Follow-ups after your visit        Your next 10 appointments already scheduled     Jul 10, 2018   Procedure with Jer Bledsoe MD   [facility], [place], Same Day Surgery (--)    [address] [facility] [address] [phone]              Who to contact     If you have questions or need follow up information about today's clinic visit or your schedule please contact [facility] BLOOD AND MARROW TRANSPLANT directly at [phone].  Normal or non-critical lab and imaging results will be communicated to you by MyChart, letter or phone within 4 business days after the clinic has received the results. If you do not hear from us within 7 days, please contact the clinic through MyChart or phone. If you have a critical or abnormal lab result, we will notify you by phone as soon as possible.  Submit refill requests through Swipp or call your pharmacy and they will forward the refill request to us. Please allow 3 business days for your refill to be completed.          Additional Information About Your Visit        Care EveryWhere ID     This is your Care EveryWhere ID. This could be used by other organizations to access your [place] medical records  [id number]        Your Vitals Were     Pulse  "Temperature Respirations Height Pulse Oximetry BMI (Body Mass Index)    60 98  F (36.7  C) (Oral) 18 1.848 m (6' 0.76\") 99% 25.46 kg/m2       Blood Pressure from Last 3 Encounters:   07/09/18 133/90   05/31/18 151/86   06/12/17 120/76    Weight from Last 3 Encounters:   07/09/18 87 kg (191 lb 11.2 oz)   05/31/18 86.3 kg (190 lb 4.8 oz)   06/12/17 86.6 kg (191 lb)              We Performed the Following     CBC with platelets differential        Recent Review Flowsheet Data     BMT Recent Results Latest Ref Rng & Units 12/23/2013 5/31/2018 7/9/2018    WBC 4.0 - 11.0 10e9/L - 6.5 6.4    Hemoglobin 13.3 - 17.7 g/dL 15.4 15.9 16.5    Platelet Count 150 - 450 10e9/L - 164 187    Neutrophils (Absolute) 1.6 - 8.3 10e9/L - 3.9 3.6    INR 0.86 - 1.14 - 1.02 -    Sodium 133 - 144 mmol/L - 139 -    Potassium 3.4 - 5.3 mmol/L - 3.9 -    Chloride 94 - 109 mmol/L - 104 -    Glucose 70 - 99 mg/dL - 67(L) -    Urea Nitrogen 7 - 30 mg/dL - 16 -    Creatinine 0.66 - 1.25 mg/dL - 1.09 -    Calcium (Total) 8.5 - 10.1 mg/dL - 9.0 -    Protein (Total) 6.8 - 8.8 g/dL - 6.8 -    Albumin 3.4 - 5.0 g/dL - 3.9 -    Alkaline Phosphatase 40 - 150 U/L - 79 -    AST 0 - 45 U/L - 22 -    ALT 0 - 70 U/L - 33 -    MCV 78 - 100 fl - 91 90               Primary Care Provider Office Phone # Fax #    Gab Smith [phone] [address] [facility] [address]        Equal Access to Services     [facility] BERNARDO AH: Hadii dominic Castillo, linda luqadaha, qaybta kanicola monaco ah. So [facility] [phone].    ATENCIÓN: Si habla español, tiene a oneil disposición servicios gratuitos de asistencia lingüística. Llame al [phone].    We comply with applicable federal civil rights laws and [place] laws. We do not discriminate on the basis of race, color, national origin, age, disability, sex, sexual orientation, or gender identity.            Thank you!     Thank you for " choosing [facility] BLOOD AND MARROW TRANSPLANT  for your care. Our goal is always to provide you with excellent care. Hearing back from our patients is one way we can continue to improve our services. Please take a few minutes to complete the written survey that you may receive in the mail after your visit with us. Thank you!             Your Updated Medication List - Protect others around you: Learn how to safely use, store and throw away your medicines at www.disposemymeds.org.          This list is accurate as of 7/9/18  9:34 AM.  Always use your most recent med list.                   Brand Name Dispense Instructions for use Diagnosis    ibuprofen 200 MG tablet   Generic drug:  ibuprofen      1 TABLET EVERY 4 TO 6 HOURS AS NEEDED        MULTIVITAMIN TABS   OR      1 TABLET DAILY

## 2018-07-09 NOTE — PROGRESS NOTES
BMT Donor History and Physical    Harman Giles MRN# [id number]   Age: 22 year old YOB: 1995            Assessment and Plan   Harman Giles is a 22 year old man who is an unrelated bone marrow donor throug the [facility]. He is feeling well today. Procedure reviewed by Caleb Louise. Consent signed.     HPI: Harman returns for follow-up prior to planned bone marrow harvest tomorrow as an unrelated donor. No recent illnesses. Feeling well today.     Transfusions: No  Hepatitis: No  Alcohol use: Yes, amount per week: Drinks on the weekends with his friends  Tobacco use: No  Recreational drugs: No  Nonmedical percutaneous drug use: No  Recent immunizations or planning on getting any immunizations: No  Ear or body piercing in the last 12 months: No  New tattoo in recent 12 months:No  History of cancer or blood disease: No  Known risk factors: none         Past Medical History:   Not under the care of a physician. No current or past medical conditions.         Past Surgical History:   Deviated septum surgery about 5 years ago with repair  Right arm surgery for fracture at 3 years old  [place] teeth removal  - no issues with sedation         Social History:   Lives in [place] but is from [place] where his parents currently lives.  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  Works out, plays basketball       Family History:   Grandpa x2 had prostate cancer in their 80's.  No history of heart disease or stroke  Grandma had late onset diabetes  Parents are healthy  Brother is healthy         Immunizations:   Immunizations are up to date         Allergies:   Keflex- upset stomach but not true allergy per pt         Medications:   Not currently taking medication including OTC         Review of Systems:   Constitutional: Negative, no fevers, chills or sweats, no recent weight loss   - Skin: No rash   - Musculoskeletal: no issues  - Eyes: Neg  - ENT: Neg   - Endocrine: neg  - Respiratory: Neg   -  "Cardiovascular: Neg   - Heme: Neg   - Lymph: Neg   - GI: Neg  - : Neg   - Neuro: Neg  -MSK: Neg      Physical EXAM:  Blood pressure 133/90, pulse 60, temperature 98  F (36.7  C), temperature source Oral, resp. rate 18, height 1.848 m (6' 0.76\"), weight 87 kg (191 lb 11.2 oz), SpO2 99 %.    General: A&O x3, appropriate, NAD  - Head: NC/AT   - Eyes: PERRL. Sclera anicteric.   - Nose/Mouth/Throat: No OP erythema, buccal mucosa moist, no ulcerations.   - Neck: Supple. No LAD appreciated.   - Lungs: CTAB   - Cardiovascular: RRR, No M/R/G.   - Abdominal/Rectal: Soft, NT,ND, +BS x4   - Lymphatics: No edema.   - Skin: No rashes or petechaie.   - Neuro: A&O x 3; Non-focal, CN II-XII grossly intact             Data:     Lab Results   Component Value Date    WBC 6.4 07/09/2018    ANEU 3.6 07/09/2018    HGB 16.5 07/09/2018    HCT 47.8 07/09/2018     07/09/2018     05/31/2018    POTASSIUM 3.9 05/31/2018    CHLORIDE 104 05/31/2018    CO2 27 05/31/2018    GLC 67 (L) 05/31/2018    BUN 16 05/31/2018    CR 1.09 05/31/2018    INR 1.02 05/31/2018    BILITOTAL 0.5 05/31/2018    AST 22 05/31/2018    ALT 33 05/31/2018    ALKPHOS 79 05/31/2018    PROTTOTAL 6.8 05/31/2018    ALBUMIN 3.9 05/31/2018            Kristina Allen PA-C       "

## 2018-07-10 ENCOUNTER — ANESTHESIA (OUTPATIENT)
Dept: SURGERY | Facility: CLINIC | Age: 23
End: 2018-07-10
Payer: COMMERCIAL

## 2018-07-10 ENCOUNTER — HOSPITAL ENCOUNTER (OUTPATIENT)
Facility: CLINIC | Age: 23
Discharge: HOME | End: 2018-07-10
Attending: INTERNAL MEDICINE | Admitting: INTERNAL MEDICINE
Payer: COMMERCIAL

## 2018-07-10 ENCOUNTER — ANESTHESIA EVENT (OUTPATIENT)
Dept: SURGERY | Facility: CLINIC | Age: 23
End: 2018-07-10
Payer: COMMERCIAL

## 2018-07-10 ENCOUNTER — DOCUMENTATION ONLY (OUTPATIENT)
Dept: TRANSPLANT | Facility: CLINIC | Age: 23
End: 2018-07-10

## 2018-07-10 VITALS
RESPIRATION RATE: 16 BRPM | HEIGHT: 74 IN | WEIGHT: 187.61 LBS | SYSTOLIC BLOOD PRESSURE: 117 MMHG | DIASTOLIC BLOOD PRESSURE: 79 MMHG | OXYGEN SATURATION: 100 % | BODY MASS INDEX: 24.08 KG/M2 | TEMPERATURE: 98.2 F

## 2018-07-10 DIAGNOSIS — Z52.3 BONE MARROW DONOR: Primary | ICD-10-CM

## 2018-07-10 LAB
BASOPHILS # BLD AUTO: 0 10E9/L (ref 0–0.2)
BASOPHILS NFR BLD AUTO: 0.1 %
DIFFERENTIAL METHOD BLD: ABNORMAL
EOSINOPHIL # BLD AUTO: 0 10E9/L (ref 0–0.7)
EOSINOPHIL NFR BLD AUTO: 0.1 %
ERYTHROCYTE [DISTWIDTH] IN BLOOD BY AUTOMATED COUNT: 12.3 % (ref 10–15)
GLUCOSE BLDC GLUCOMTR-MCNC: 99 MG/DL (ref 70–99)
HCT VFR BLD AUTO: 43.2 % (ref 40–53)
HGB BLD-MCNC: 15 G/DL (ref 13.3–17.7)
IMM GRANULOCYTES # BLD: 0 10E9/L (ref 0–0.4)
IMM GRANULOCYTES NFR BLD: 0.4 %
LYMPHOCYTES # BLD AUTO: 0.8 10E9/L (ref 0.8–5.3)
LYMPHOCYTES NFR BLD AUTO: 7.6 %
MCH RBC QN AUTO: 31.6 PG (ref 26.5–33)
MCHC RBC AUTO-ENTMCNC: 34.7 G/DL (ref 31.5–36.5)
MCV RBC AUTO: 91 FL (ref 78–100)
MONOCYTES # BLD AUTO: 0.3 10E9/L (ref 0–1.3)
MONOCYTES NFR BLD AUTO: 2.7 %
NEUTROPHILS # BLD AUTO: 9.7 10E9/L (ref 1.6–8.3)
NEUTROPHILS NFR BLD AUTO: 89.1 %
NRBC # BLD AUTO: 0 10*3/UL
NRBC BLD AUTO-RTO: 0 /100
PLATELET # BLD AUTO: 161 10E9/L (ref 150–450)
RBC # BLD AUTO: 4.74 10E12/L (ref 4.4–5.9)
WBC # BLD AUTO: 10.9 10E9/L (ref 4–11)
WBC MAR: 26.3 10E9/L

## 2018-07-10 PROCEDURE — 40000170 ZZH STATISTIC PRE-PROCEDURE ASSESSMENT II: Performed by: INTERNAL MEDICINE

## 2018-07-10 PROCEDURE — 25800025 ZZH RX 258: Performed by: INTERNAL MEDICINE

## 2018-07-10 PROCEDURE — 36000051 ZZH SURGERY LEVEL 2 1ST 30 MIN - UMMC: Performed by: INTERNAL MEDICINE

## 2018-07-10 PROCEDURE — 25000566 ZZH SEVOFLURANE, EA 15 MIN: Performed by: INTERNAL MEDICINE

## 2018-07-10 PROCEDURE — P9041 ALBUMIN (HUMAN),5%, 50ML: HCPCS | Performed by: NURSE ANESTHETIST, CERTIFIED REGISTERED

## 2018-07-10 PROCEDURE — 36415 COLL VENOUS BLD VENIPUNCTURE: CPT | Performed by: PHYSICIAN ASSISTANT

## 2018-07-10 PROCEDURE — 25000128 H RX IP 250 OP 636: Performed by: NURSE ANESTHETIST, CERTIFIED REGISTERED

## 2018-07-10 PROCEDURE — 38214 VOLUME DEPLETE OF HARVEST: CPT | Performed by: INTERNAL MEDICINE

## 2018-07-10 PROCEDURE — C9399 UNCLASSIFIED DRUGS OR BIOLOG: HCPCS | Performed by: NURSE ANESTHETIST, CERTIFIED REGISTERED

## 2018-07-10 PROCEDURE — 37000009 ZZH ANESTHESIA TECHNICAL FEE, EACH ADDTL 15 MIN: Performed by: INTERNAL MEDICINE

## 2018-07-10 PROCEDURE — 71000014 ZZH RECOVERY PHASE 1 LEVEL 2 FIRST HR: Performed by: INTERNAL MEDICINE

## 2018-07-10 PROCEDURE — 25000125 ZZHC RX 250: Performed by: NURSE ANESTHETIST, CERTIFIED REGISTERED

## 2018-07-10 PROCEDURE — 85025 COMPLETE CBC W/AUTO DIFF WBC: CPT | Performed by: PHYSICIAN ASSISTANT

## 2018-07-10 PROCEDURE — 85048 AUTOMATED LEUKOCYTE COUNT: CPT | Performed by: INTERNAL MEDICINE

## 2018-07-10 PROCEDURE — 82962 GLUCOSE BLOOD TEST: CPT

## 2018-07-10 PROCEDURE — 27211024 ZZHC OR SUPPLY OTHER OPNP: Performed by: INTERNAL MEDICINE

## 2018-07-10 PROCEDURE — 36000053 ZZH SURGERY LEVEL 2 EA 15 ADDTL MIN - UMMC: Performed by: INTERNAL MEDICINE

## 2018-07-10 PROCEDURE — 27210794 ZZH OR GENERAL SUPPLY STERILE: Performed by: INTERNAL MEDICINE

## 2018-07-10 PROCEDURE — 37000008 ZZH ANESTHESIA TECHNICAL FEE, 1ST 30 MIN: Performed by: INTERNAL MEDICINE

## 2018-07-10 RX ORDER — ALBUMIN, HUMAN INJ 5% 5 %
SOLUTION INTRAVENOUS CONTINUOUS PRN
Status: DISCONTINUED | OUTPATIENT
Start: 2018-07-10 | End: 2018-07-10

## 2018-07-10 RX ORDER — ONDANSETRON 2 MG/ML
INJECTION INTRAMUSCULAR; INTRAVENOUS PRN
Status: DISCONTINUED | OUTPATIENT
Start: 2018-07-10 | End: 2018-07-10

## 2018-07-10 RX ORDER — FENTANYL CITRATE 50 UG/ML
25-50 INJECTION, SOLUTION INTRAMUSCULAR; INTRAVENOUS
Status: CANCELLED | OUTPATIENT
Start: 2018-07-10

## 2018-07-10 RX ORDER — FENTANYL CITRATE 50 UG/ML
INJECTION, SOLUTION INTRAMUSCULAR; INTRAVENOUS PRN
Status: DISCONTINUED | OUTPATIENT
Start: 2018-07-10 | End: 2018-07-10

## 2018-07-10 RX ORDER — DEXAMETHASONE SODIUM PHOSPHATE 4 MG/ML
INJECTION, SOLUTION INTRA-ARTICULAR; INTRALESIONAL; INTRAMUSCULAR; INTRAVENOUS; SOFT TISSUE PRN
Status: DISCONTINUED | OUTPATIENT
Start: 2018-07-10 | End: 2018-07-10

## 2018-07-10 RX ORDER — MEPERIDINE HYDROCHLORIDE 50 MG/ML
12.5 INJECTION INTRAMUSCULAR; INTRAVENOUS; SUBCUTANEOUS
Status: DISCONTINUED | OUTPATIENT
Start: 2018-07-10 | End: 2018-07-10 | Stop reason: HOSPADM

## 2018-07-10 RX ORDER — ONDANSETRON 2 MG/ML
4 INJECTION INTRAMUSCULAR; INTRAVENOUS EVERY 30 MIN PRN
Status: DISCONTINUED | OUTPATIENT
Start: 2018-07-10 | End: 2018-07-10 | Stop reason: HOSPADM

## 2018-07-10 RX ORDER — PROPOFOL 10 MG/ML
INJECTION, EMULSION INTRAVENOUS PRN
Status: DISCONTINUED | OUTPATIENT
Start: 2018-07-10 | End: 2018-07-10

## 2018-07-10 RX ORDER — LABETALOL HYDROCHLORIDE 5 MG/ML
5 INJECTION, SOLUTION INTRAVENOUS EVERY 10 MIN PRN
Status: DISCONTINUED | OUTPATIENT
Start: 2018-07-10 | End: 2018-07-10 | Stop reason: HOSPADM

## 2018-07-10 RX ORDER — LIDOCAINE 40 MG/G
CREAM TOPICAL
Status: DISCONTINUED | OUTPATIENT
Start: 2018-07-10 | End: 2018-07-10 | Stop reason: HOSPADM

## 2018-07-10 RX ORDER — FENTANYL CITRATE 50 UG/ML
25-50 INJECTION, SOLUTION INTRAMUSCULAR; INTRAVENOUS EVERY 5 MIN PRN
Status: DISCONTINUED | OUTPATIENT
Start: 2018-07-10 | End: 2018-07-10 | Stop reason: HOSPADM

## 2018-07-10 RX ORDER — SODIUM CHLORIDE, SODIUM LACTATE, POTASSIUM CHLORIDE, CALCIUM CHLORIDE 600; 310; 30; 20 MG/100ML; MG/100ML; MG/100ML; MG/100ML
INJECTION, SOLUTION INTRAVENOUS CONTINUOUS
Status: DISCONTINUED | OUTPATIENT
Start: 2018-07-10 | End: 2018-07-10 | Stop reason: HOSPADM

## 2018-07-10 RX ORDER — NALOXONE HYDROCHLORIDE 0.4 MG/ML
.1-.4 INJECTION, SOLUTION INTRAMUSCULAR; INTRAVENOUS; SUBCUTANEOUS
Status: DISCONTINUED | OUTPATIENT
Start: 2018-07-10 | End: 2018-07-10 | Stop reason: HOSPADM

## 2018-07-10 RX ORDER — ONDANSETRON 4 MG/1
4 TABLET, ORALLY DISINTEGRATING ORAL EVERY 30 MIN PRN
Status: DISCONTINUED | OUTPATIENT
Start: 2018-07-10 | End: 2018-07-10 | Stop reason: HOSPADM

## 2018-07-10 RX ORDER — SODIUM CHLORIDE, SODIUM LACTATE, POTASSIUM CHLORIDE, CALCIUM CHLORIDE 600; 310; 30; 20 MG/100ML; MG/100ML; MG/100ML; MG/100ML
INJECTION, SOLUTION INTRAVENOUS CONTINUOUS PRN
Status: DISCONTINUED | OUTPATIENT
Start: 2018-07-10 | End: 2018-07-10

## 2018-07-10 RX ORDER — HYDROMORPHONE HYDROCHLORIDE 1 MG/ML
.3-.5 INJECTION, SOLUTION INTRAMUSCULAR; INTRAVENOUS; SUBCUTANEOUS EVERY 10 MIN PRN
Status: DISCONTINUED | OUTPATIENT
Start: 2018-07-10 | End: 2018-07-10 | Stop reason: HOSPADM

## 2018-07-10 RX ORDER — HYDRALAZINE HYDROCHLORIDE 20 MG/ML
2.5-5 INJECTION INTRAMUSCULAR; INTRAVENOUS EVERY 10 MIN PRN
Status: DISCONTINUED | OUTPATIENT
Start: 2018-07-10 | End: 2018-07-10 | Stop reason: HOSPADM

## 2018-07-10 RX ORDER — LIDOCAINE HYDROCHLORIDE 20 MG/ML
INJECTION, SOLUTION INFILTRATION; PERINEURAL PRN
Status: DISCONTINUED | OUTPATIENT
Start: 2018-07-10 | End: 2018-07-10

## 2018-07-10 RX ADMIN — SUGAMMADEX 200 MG: 100 INJECTION, SOLUTION INTRAVENOUS at 08:45

## 2018-07-10 RX ADMIN — PROPOFOL 200 MG: 10 INJECTION, EMULSION INTRAVENOUS at 08:02

## 2018-07-10 RX ADMIN — DEXTROSE AND SODIUM CHLORIDE: 5; 450 INJECTION, SOLUTION INTRAVENOUS at 10:51

## 2018-07-10 RX ADMIN — ALBUMIN HUMAN: 0.05 INJECTION, SOLUTION INTRAVENOUS at 08:35

## 2018-07-10 RX ADMIN — SODIUM CHLORIDE, POTASSIUM CHLORIDE, SODIUM LACTATE AND CALCIUM CHLORIDE: 600; 310; 30; 20 INJECTION, SOLUTION INTRAVENOUS at 07:30

## 2018-07-10 RX ADMIN — LIDOCAINE HYDROCHLORIDE 100 MG: 20 INJECTION, SOLUTION INFILTRATION; PERINEURAL at 07:58

## 2018-07-10 RX ADMIN — PHENYLEPHRINE HYDROCHLORIDE 100 MCG: 10 INJECTION, SOLUTION INTRAMUSCULAR; INTRAVENOUS; SUBCUTANEOUS at 08:07

## 2018-07-10 RX ADMIN — FENTANYL CITRATE 100 MCG: 50 INJECTION, SOLUTION INTRAMUSCULAR; INTRAVENOUS at 07:56

## 2018-07-10 RX ADMIN — ONDANSETRON 4 MG: 2 INJECTION INTRAMUSCULAR; INTRAVENOUS at 08:24

## 2018-07-10 RX ADMIN — DEXAMETHASONE SODIUM PHOSPHATE 4 MG: 4 INJECTION, SOLUTION INTRA-ARTICULAR; INTRALESIONAL; INTRAMUSCULAR; INTRAVENOUS; SOFT TISSUE at 08:24

## 2018-07-10 RX ADMIN — ROCURONIUM BROMIDE 50 MG: 10 INJECTION INTRAVENOUS at 08:03

## 2018-07-10 RX ADMIN — ALBUMIN HUMAN: 0.05 INJECTION, SOLUTION INTRAVENOUS at 08:21

## 2018-07-10 RX ADMIN — MIDAZOLAM 2 MG: 1 INJECTION INTRAMUSCULAR; INTRAVENOUS at 07:49

## 2018-07-10 NOTE — PLAN OF CARE
Problem: Patient Care Overview  Goal: Plan of Care/Patient Progress Review  Outcome: No Change  Outpatient/Observation goals to be met before discharge home:    1)  IV fluids discontinued, tolerating PO intake: Yes  2)  Hemoglobin greater than or equal to 8.0 (from 3pm CBC): No, waiting on lab draw and results  3)  Voiding adequately: Yes  4)  Afebrile: Yes  5)  Adequate pain control: Yes  6) Bone marrow harvest site dressing clean, dry and intact: Yes    AVSS.  Denies pain and nausea.  Good appetite.  Continue POC.

## 2018-07-10 NOTE — ANESTHESIA PREPROCEDURE EVALUATION
Anesthesia Evaluation     .             ROS/MED HX    ENT/Pulmonary:  - neg pulmonary ROS     Neurologic:  - neg neurologic ROS     Cardiovascular:  - neg cardiovascular ROS       METS/Exercise Tolerance:  >4 METS   Hematologic:  - neg hematologic  ROS       Musculoskeletal:  - neg musculoskeletal ROS       GI/Hepatic:  - neg GI/hepatic ROS       Renal/Genitourinary:  - ROS Renal section negative       Endo:  - neg endo ROS       Psychiatric:  - neg psychiatric ROS       Infectious Disease:  - neg infectious disease ROS       Malignancy:      - no malignancy   Other:                     Physical Exam  Normal systems: cardiovascular, pulmonary and dental    Airway   Mallampati: I  TM distance: >3 FB  Neck ROM: full    Dental     Cardiovascular   Rhythm and rate: regular and normal      Pulmonary    breath sounds clear to auscultation                    Anesthesia Plan      History & Physical Review      ASA Status:  1 .    NPO Status:  > 8 hours    Plan for General and ETT with Intravenous induction. Maintenance will be Balanced.    PONV prophylaxis:  Ondansetron (or other 5HT-3) and Dexamethasone or Solumedrol       Postoperative Care  Postoperative pain management:  IV analgesics, Oral pain medications and Multi-modal analgesia.      Consents  Anesthetic plan, risks, benefits and alternatives discussed with:  Patient..                          .

## 2018-07-10 NOTE — ANESTHESIA POSTPROCEDURE EVALUATION
Patient: Harman Giles    Procedure(s):  Bone Marrow [place]  - Wound Class: I-Clean    Diagnosis:Donor   Diagnosis Additional Information: No value filed.    Anesthesia Type:  General, ETT    Note:  Anesthesia Post Evaluation    Patient location during evaluation: PACU  Patient participation: Able to fully participate in evaluation  Level of consciousness: awake and alert  Pain management: satisfactory to patient  Airway patency: patent  Cardiovascular status: acceptable and stable  Respiratory status: acceptable and spontaneous ventilation  Hydration status: euvolemic  PONV: controlled     Anesthetic complications: None          Last vitals:  Vitals:    07/10/18 0920 07/10/18 0930 07/10/18 0940   BP: 110/70 124/78 (!) 128/100   Resp: 15 16 16   Temp:      SpO2: 100% 99% 99%         Electronically Signed By: Kt Larose MD  July 10, 2018  10:01 AM

## 2018-07-10 NOTE — DISCHARGE SUMMARY
"[facility] Discharge Summary   Harman Giles MRN# [id number]   Age: 22 year old  YOB: 1995   Date of Admission: 7/10/2018  Date of Discharge:    Admitting Physician: Bill Ayala MD  Discharge Physician:  Dr. Ayala  Discharge Diagnoses:    1.  Status Post Bone Marrow [place] for Unrelated recipient for the National Marrow Donor Program/[employer]  Discharge Medications:       Harman Giles   Home Medication Instructions ZEE:[id number]    Printed on:07/10/18 8270   Medication Information                      MULTIVITAMIN TABS   OR  1 TABLET DAILY               Brief History of Illness:    **Adopted from H&P  Unrelated, healthy donor for the NMDP.  Doing well on admission to donate bone marrow. He is doing well.  He is on room air.  He says he can tell he had a procedure but denies pain.  No new cough or sob.  No bleeding at the harvest site. No nausea or emesis.  He has urinated.  He has eaten something as well as drinking fluids by mouth.  He is going to stay in the [facility] with his dad and fly home on Sunday to [place]    Physical Exam:    /79 (BP Location: Right arm)  Temp 98.2  F (36.8  C) (Oral)  Resp 16  Ht 1.88 m (6' 2\")  Wt 85.1 kg (187 lb 9.8 oz)  SpO2 100%  BMI 24.09 kg/m2  General Appearance: well appearing, NAD.   HEENT: sclera anicteric, EOMI. Moist mucus membranes, no ulcerations or thrush.   CV: RRR, no murmur or rub.   RESP: CTA bilaterally; no rales or wheezes.  GI: +BS, soft, nontender, no masses or hepatosplenomegaly.  EXT: no edema caleb LE's  SKIN:  No rash or lesions on exposed areas.  NEURO: A&O x3; CN II-XII grossly intact.  PSYCH: Appropriate affect  VASCULAR ACCESS: Peripheral IV  Hospital Course:    BMT: Patient donated approximately 600 ml of bone marrow for an unrelated recipeint.  He has urinated and VSS, and on room air.  As long as hgb is >8 then can discharge home   He has " declined any tylenol #3 RX for his pain.  He will take tylenol if he has discomfort.  CODE STATUS:   Full  Discharge Instructions and Follow-Up:    Discharge diet: Regular diet as tolerated  Discharge activity: Activity as tolerated   Discharge follow-up: Follow up with BMT Clinic as follows:  Call and can be seen if needed  Bone Marrow Transplant Office: [phone] and they can direct you to your Nurse Coordinator.    -If after hours, you can contact the Blood and Marrow Transplant Physician on call by contacting the [facility]  at [phone] and asking for the BMT physician to call you.   - You can also call the Bone Marrow Transplant Clinic: [phone] and they can direct you to the right person.      Discharge Disposition:    Discharged to stay at [facility] in [place], [place].  Please hold Ibuprofen for now as can cause platelets to not function well which can lead to bleeding.     POST BONE MARROW HARVEST DISCHARGE INSTRUCTIONS     Thank you again for your generous donation of life-saving bone marrow.  Along with the discharge instructions that the inpatient staff has or will provide to you, here are some additional suggestions/recommendations:     1. The large pressure dressing on your lower back harvest site(s) needs to remain in place for 24 hours post donation.  You may shower tomorrow, and then remove the large bandage.  Small steri-strips(which take the place of stitches) cover the actual incision. Leave these in place,they will fall off after approximately 1 week. You will probably notice some bruising around the area- this is normal and should resolve within a week or so.  Look for signs of infection( ie. redness, fever, drainage, pain or swelling) and report these immediately to any of the contacts below. It is best not to soak in a bathtub or whirlpool for 1 week.     2.  You have received intravenous fluids, along with your autologous blood units(if indicated) in the  "operating room this morning. It is normal for you to notice some slight puffiness in your hands, feet or face. Your body will eliminate this extra fluid naturally through urination.     3.  Some soreness and stiffness is to be expected after the procedure. Tylenol or extra strength tylenol should be adequate for pain relief.  We encourage you to take it easy for the next day or so.  Try and keep feet elevated if at all possible, and avoid strenuous activity for at least one week following your donation.     4. It is completely normal for you to feel very tired or \"wiped out\" for the first day or so after donation. Listen to your body and take frequent naps if possible. Make sure to get adequate sleep at night, and you will soon feel completely restored.     5.  Should you have any questions or concerns, your contacts are:     -Bone Marrow Transplant Office: [phone] and they can direct you to your Nurse Coordinator.    -If after hours, you can contact the Blood and Marrow Transplant Physician on call by contacting the [facility]  at [phone] and asking for the BMT physician to call you.   - You can also call the Bone Marrow Transplant Clinic: [phone] and they can direct you to the right person.    Namrata Ortiz PA-C  Pager: [phone]  Blood and Marrow Transplant  July 10, 2018                "

## 2018-07-10 NOTE — PROCEDURES
Donor had been consented previously. After verifying patient identify and confirming procedure we performed, under sterile technique, a bone marrow harvest from bilateral posterior iliac crests. The estimated blood loss/harvest volume was 600 mL. The patient tolerated the procedure well with no immediate complications. We reviewed procedure course with father.  I was assisted in this harvest by Caleb Hodges RN.

## 2018-07-10 NOTE — TELEPHONE ENCOUNTER
Assisted with harvest of bone from bilateral, posterior iliac crests.  EBL= 600cc with estimated nucleated cell count of > or = to 10 x 10^8 nc/kg obtained (based uopn recipient weight of 14kg) after marrow filtered at back table.  Marrow product taken to Blood Bank at 9010 for export to Cell Processing lab and subsequent transfer to outside institution for infusion into unrelated recipient.  Marrow product number:  [id number]

## 2018-07-10 NOTE — PROGRESS NOTES
Pt discharged to: home  Via: ambulatory  Accompanied  By: family  Belongings: sent with patient  Teaching: reviewed discharge teaching-labs,when to take dressing off, what to look for and who to call if issues arise at home.

## 2018-07-10 NOTE — INTERIM SUMMARY
BMT Summary of Care      July 10, 2018 3:16 PM  Harman Giles  MRN: [id number]    Discharge Date:  7/10/2018      BMT Primary Physician: Dr. Jer Bledsoe    BMT Nurse Coordinator: Caleb Louise    Discharge Diagnosis:   1.  Status post Bone Marrow [place] for Unrelated recipient at the [facility]    Discharge To: Home     Activity: as below       Harman Giles   Home Medication Instructions ZEE:[id number]    Printed on:07/10/18 6245   Medication Information                      MULTIVITAMIN TABS   OR  1 TABLET DAILY               Please hold Ibuprofen for now as can cause platelets to not function well which can lead to bleeding.    POST BONE MARROW HARVEST DISCHARGE INSTRUCTIONS    Thank you again for your generous donation of life-saving bone marrow.  Along with the discharge instructions that the inpatient staff has or will provide to you, here are some additional suggestions/recommendations:    1. The large pressure dressing on your lower back harvest site(s) needs to remain in place for 24 hours post donation.  You may shower tomorrow, and then remove the large bandage.  Small steri-strips(which take the place of stitches) cover the actual incision. Leave these in place,they will fall off after approximately 1 week. You will probably notice some bruising around the area- this is normal and should resolve within a week or so.  Look for signs of infection( ie. redness, fever, drainage, pain or swelling) and report these immediately to any of the contacts below. It is best not to soak in a bathtub or whirlpool for 1 week.    2.  You have received intravenous fluids, along with your autologous blood units(if indicated) in the operating room this morning. It is normal for you to notice some slight puffiness in your hands, feet or face. Your body will eliminate this extra fluid naturally through urination.    3.  Some soreness and stiffness is to be expected after the procedure. Tylenol or extra  "strength tylenol should be adequate for pain relief.  We encourage you to take it easy for the next day or so.  Try and keep feet elevated if at all possible, and avoid strenuous activity for at least one week following your donation.    4. It is completely normal for you to feel very tired or \"wiped out\" for the first day or so after donation. Listen to your body and take frequent naps if possible. Make sure to get adequate sleep at night, and you will soon feel completely restored.    5.  Should you have any questions or concerns, your contacts are:    -Bone Marrow Transplant Office: [phone] and they can direct you to your Nurse Coordinator.    -If after hours, you can contact the Blood and Marrow Transplant Physician on call by contacting the [facility]  at [phone] and asking for the BMT physician to call you.   - You can also call the Bone Marrow Transplant Clinic: [phone] and they can direct you to the right person.        Nutrition: Regular diet as tolerated  Appointments:   No follow up scheduled.  Has numbers as above to call if has issues.  Namrata Ortiz PA-C    "

## 2018-07-10 NOTE — PROGRESS NOTES
"BMT Daily Progress Note   07/10/2018    Patient ID:  Harman Giles is a 22 year old man who is an unrelated donor of bone marrow for the NMDP     Diagnosis No data was found  HCT Type No data was found    Prep Regimen No data was found   Donor Source No data was found    GVHD Prophylaxis No data was found  Primary BMT Provider      Harman was admitted for observation post bone marrow harvest on 7/10/2018.     INTERVAL  HISTORY     He is doing well.  He is on room air.  He says he can tell he had a procedure but denies pain.  No new cough or sob.  No bleeding at the harvest site. No nausea or emesis.  He has urinated.  He has eaten something as well as drinking fluids by mouth.  He is going to stay in the [facility] with his dad and fly home on Sunday to [place].    Review of Systems: 10 point ROS negative except as noted above.  # Pain Assessment:  Current Pain Score 7/10/2018   Patient currently in pain? denies   Pain location -   Harman s pain level was assessed and he currently denies pain.  As per HPI    Scheduled Medications    Current Facility-Administered Medications   Medication     acetaminophen-codeine (TYLENOL #3) 300-30 MG per tablet 1-2 tablet     dextrose 5% and 0.45% NaCl infusion     naloxone (NARCAN) injection 0.1-0.4 mg       PHYSICAL EXAM     Weight In/Out     Wt Readings from Last 3 Encounters:   07/10/18 85.1 kg (187 lb 9.8 oz)   07/09/18 87 kg (191 lb 11.2 oz)   05/31/18 86.3 kg (190 lb 4.8 oz)                  /79 (BP Location: Right arm)  Temp 98.2  F (36.8  C) (Oral)  Resp 16  Ht 1.88 m (6' 2\")  Wt 85.1 kg (187 lb 9.8 oz)  SpO2 100%  BMI 24.09 kg/m2       General: NAD   Eyes: : NURIS, sclera anicteric   Nose/Mouth/Throat: OP clear, buccal mucosa moist, no ulcerations   Lungs: CTA bilaterally  Cardiovascular: RRR, no M/R/G   Abdominal/Rectal: +BS, soft, NT, ND, No HSM   Lymphatics: no edema  Skin: no rashes or petechaie  Neuro: A&O   Additional Findings: Peripheral IV " site: NT, no drainage.      LABS AND IMAGING - PAST 24 HOURS     Results for orders placed or performed during the hospital encounter of 07/10/18 (from the past 24 hour(s))   Glucose by meter   Result Value Ref Range    Glucose 99 70 - 99 mg/dL   WBC Bone marrow   Result Value Ref Range    WBC Bone Marrow 26.3 10e9/L         ASSESSMENT BY SYSTEMS     BMT: Patient donated approximately 600 ml of bone marrow for the unrelated donor.  He has urinated and VSS, and on room air.  As long as hgb is >8 then can discharge home   He has declined any tylenol #3 RX for his pain.  He will take tylenol if he has discomfort.  .bone    Dispo:   POST BONE MARROW HARVEST DISCHARGE INSTRUCTIONS    Thank you again for your generous donation of life-saving bone marrow.  Along with the discharge instructions that the inpatient staff has or will provide to you, here are some additional suggestions/recommendations:    1. The large pressure dressing on your lower back harvest site(s) needs to remain in place for 24 hours post donation.  You may shower tomorrow, and then remove the large bandage.  Small steri-strips(which take the place of stitches) cover the actual incision. Leave these in place,they will fall off after approximately 1 week. You will probably notice some bruising around the area- this is normal and should resolve within a week or so.  Look for signs of infection( ie. redness, fever, drainage, pain or swelling) and report these immediately to any of the contacts below. It is best not to soak in a bathtub or whirlpool for 1 week.    2.  You have received intravenous fluids, along with your autologous blood units(if indicated) in the operating room this morning. It is normal for you to notice some slight puffiness in your hands, feet or face. Your body will eliminate this extra fluid naturally through urination.    3.  Some soreness and stiffness is to be expected after the procedure. Tylenol or extra strength tylenol should  "be adequate for pain relief.  We encourage you to take it easy for the next day or so.  Try and keep feet elevated if at all possible, and avoid strenuous activity for at least one week following your donation.    4. It is completely normal for you to feel very tired or \"wiped out\" for the first day or so after donation. Listen to your body and take frequent naps if possible. Make sure to get adequate sleep at night, and you will soon feel completely restored.    5.  Should you have any questions or concerns, your contacts are:    -Bone Marrow Transplant Office: [phone] and they can direct you to your Nurse Coordinator.    -If after hours, you can contact the Blood and Marrow Transplant Physician on call by contacting the [facility]  at [phone] and asking for the BMT physician to call you.   - You can also call the Bone Marrow Transplant Clinic: [phone] and they can direct you to the right person.      CODE STATUS:   Full      OVERALL PLAN     Anticipated hospital dismissal: this afternoon    Namrata Ortiz    "

## 2018-07-10 NOTE — PLAN OF CARE
Problem: Patient Care Overview  Goal: Plan of Care/Patient Progress Review  Outpatient/Observation goals to be met before discharge home:    1)  IV fluids discontinued, tolerating PO intake: Yes  2)  Hemoglobin greater than or equal to 8.0 (from 3pm CBC): No, lab draw for CBC at 1400.  3)  Voiding adequately: Yes  4)  Afebrile: Yes  5)  Adequate pain control: Yes  6) Bone marrow harvest site dressing clean, dry and intact: Yes

## 2018-07-11 ENCOUNTER — DOCUMENTATION ONLY (OUTPATIENT)
Dept: TRANSPLANT | Facility: CLINIC | Age: 23
End: 2018-07-11

## 2018-07-11 NOTE — TELEPHONE ENCOUNTER
POST HOSPITAL DISCHARGE FOLLOW-UP PHONE CALL    Follow-Up Type: Hospital Discharge - Follow-Up Call  Date of Admission: 7/10/18  Date of Discharge:7/10/18  Discharge Diagnosis: healthy bone marrow donor  Next BMT Follow-up Visit: JETT  Discharging Provider: JETT  Primary BMT Physician: JETT    Summary of Encounter:  Contacted patient via phone to conduct follow-up assessment post recent hospital discharge. Patient verbalized that they have received and understand written post discharge care instructions, have  a current medication list as well as contact phone numbers.  Patient understands to call BMT office @ [phone] during office hours Mon-Fri 8am-4:30pm, and [phone] after hours to report symptoms.  Patient verbalized that they have all necessary medications, have no questions regarding their administration instructions and is currently taking them without difficulty.    Patient does not report any new symptoms or concerns and has no further questions at this time.

## 2022-09-10 ENCOUNTER — HOSPITAL ENCOUNTER (EMERGENCY)
Facility: HOSPITAL | Age: 27
Discharge: HOME | End: 2022-09-10
Attending: EMERGENCY MEDICINE
Payer: COMMERCIAL

## 2022-09-10 VITALS
HEART RATE: 101 BPM | HEIGHT: 75 IN | SYSTOLIC BLOOD PRESSURE: 127 MMHG | WEIGHT: 185 LBS | OXYGEN SATURATION: 96 % | DIASTOLIC BLOOD PRESSURE: 79 MMHG | BODY MASS INDEX: 23 KG/M2 | TEMPERATURE: 98 F | RESPIRATION RATE: 18 BRPM

## 2022-09-10 DIAGNOSIS — S71.112A LACERATION OF LEFT THIGH, INITIAL ENCOUNTER: Primary | ICD-10-CM

## 2022-09-10 PROCEDURE — 99283 EMERGENCY DEPT VISIT LOW MDM: CPT

## 2022-09-10 PROCEDURE — 12002 RPR S/N/AX/GEN/TRNK2.6-7.5CM: CPT

## 2022-09-10 PROCEDURE — 99282 EMERGENCY DEPT VISIT SF MDM: CPT
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
## (undated) DEVICE — DRAPE LAP TRANSVERSE 29421

## (undated) DEVICE — SYR 03ML LL W/O NDL 309657

## (undated) DEVICE — PREP SKIN SCRUB TRAY 4461A

## (undated) DEVICE — BASIN EMESIS STERILE  SSK9005A

## (undated) DEVICE — DRSG STERI STRIP 1/2X4" R1547

## (undated) DEVICE — PITCHER STERILE 1000ML  SSK9004A

## (undated) DEVICE — SPONGE RAY-TEC 4X4" 7317

## (undated) DEVICE — NDL BX BONE MARROW 11GA 4"

## (undated) DEVICE — SOL ADH LIQUID BENZOIN SWAB 0.6ML C1544

## (undated) DEVICE — CONNECTOR STOPCOCK 3 WAY MALE LL HI-FLO MX9311L

## (undated) DEVICE — DRAPE IOBAN INCISE 13X13" 6640EZ

## (undated) DEVICE — PREP POVIDONE IODINE SOLUTION 10% 120ML

## (undated) DEVICE — LINEN TOWEL PACK X5 5464

## (undated) DEVICE — Device

## (undated) DEVICE — DRAPE ISOLATION BAG 1003

## (undated) DEVICE — PREP POVIDONE IODINE SCRUB 7.5% 120ML

## (undated) DEVICE — BLADE KNIFE SURG 11 371111

## (undated) DEVICE — DRAPE MAYO STAND 23X54 8337

## (undated) DEVICE — SYR 30ML LL W/O NDL 302832

## (undated) DEVICE — NDL COUNTER 20CT 31142493

## (undated) DEVICE — LINEN TOWEL PACK X6 WHITE 5487

## (undated) DEVICE — LABEL MEDICATION SYSTEM 3303-P

## (undated) RX ORDER — DEXAMETHASONE SODIUM PHOSPHATE 4 MG/ML
INJECTION, SOLUTION INTRA-ARTICULAR; INTRALESIONAL; INTRAMUSCULAR; INTRAVENOUS; SOFT TISSUE
Status: DISPENSED
Start: 2018-07-10

## (undated) RX ORDER — ALBUMIN, HUMAN INJ 5% 5 %
SOLUTION INTRAVENOUS
Status: DISPENSED
Start: 2018-07-10

## (undated) RX ORDER — FENTANYL CITRATE 50 UG/ML
INJECTION, SOLUTION INTRAMUSCULAR; INTRAVENOUS
Status: DISPENSED
Start: 2018-07-10

## (undated) RX ORDER — PHENYLEPHRINE HCL IN 0.9% NACL 1 MG/10 ML
SYRINGE (ML) INTRAVENOUS
Status: DISPENSED
Start: 2018-07-10

## (undated) RX ORDER — ONDANSETRON 2 MG/ML
INJECTION INTRAMUSCULAR; INTRAVENOUS
Status: DISPENSED
Start: 2018-07-10